# Patient Record
(demographics unavailable — no encounter records)

---

## 2024-10-18 NOTE — ASSESSMENT
[FreeTextEntry1] : PLAN: T9 thoracic kyphoplasty with radiofrequency ablation  No concerns identified today we thank the patient once again for allowing us to be a part of his care team.  Shyla Blancas MS, FNP-BC Calvin Elmore MD

## 2024-10-18 NOTE — HISTORY OF PRESENT ILLNESS
[FreeTextEntry1] : Mr. GUSTAFSON is a 64-year-old male presenting to the office today as a follow up, initially referred from Dr. Najera.   Patient was first seen on 10/1/2024 for mid-thoracic back pain that began four months prior while doing yard work. He consulted with Dr. Vallejo of pain management who performed a L4-5 NUBIA on 6/5/2024, minimal to no relief afterward. Dr. Gunter of hematology is facilitating a PET scan post biopsy. Patient underwent a T9 biopsy with Dr. Mays on 9/13/2024 appreciating a plasma cell neoplasm, + monoclonal IgG lambda lab work. He complains today of persistent midthoracic mechanical back pain. 5/6-10 pain with movement despite conservative measures and conservative medical management.  NEW IMAGING:  10/10/2024 MRI thoracic spine: Redemonstration of malignant compression fracture deformity of T9 with 50% height loss. No significant bony retropulsion. 10/10/2024 MRI lumbar spine: Mild multilevel disc bulging and degenerative changes without spinal canal or foraminal narrowing.  A surgical intervention in the form of a T9 thoracic kyphoplasty with radiofrequency ablation was discussed with the patient today, risks and benefits of such. He was made aware that this was the general consensus of the multidisciplinary tumor board that his case was presented at recently, to note. The patient wished to proceed and will be scheduled accordingly.  All questions addressed today.  PHYSICAL EXAM: Constitutional: Well appearing, no distress HEENT: Normocephalic Atraumatic Psychiatric: Alert and oriented to all spheres, normal mood Pulmonary: No respiratory distress  Neurologic: CN II-XII grossly intact Palpation: + mid spine tenderness, thoracic region upon palpation Strength: Full strength in all major muscle groups, no atrophy Sensation: Full sensation to light touch in all extremities Reflexes: 2+ patellar 2+ biceps  No Esquivel's No Clonus  ROM intact  SLR negative b/l Gait: Steady, walking w/o assistance.

## 2024-10-18 NOTE — END OF VISIT
[FreeTextEntry3] : I have independently evaluated and examined this patient, have supervised and agree with the Advanced Clinical Practice provider and their history and physical and assessment and plan above.  Risk benefits and alternatives to the T9 radiofrequency ablation of spine tumor, kyphoplasty were discussed with the patient risks include but not limited to infection, bleeding, cement migration, neurologic damage or deficit including but not limited to weakness, paralysis, coma, death.  Understanding these risks the patient is given informed consent to proceed. [Time Spent: ___ minutes] : I have spent [unfilled] minutes of time on the encounter which excludes teaching and separately reported services.

## 2024-10-25 NOTE — VITALS
[Date: ____________] : Patient's last distress assessment performed on [unfilled]. [0 - No Distress] : Distress Level: 0 [Patient given social work contact information and resource sheet] : Patient was given social work contact information and resource sheet [Maximal Pain Intensity: 6/10] : 6/10 [Pain Location: ___] : Pain Location: [unfilled] [NoTreatment Scheduled] : no treatment scheduled [90: Able to carry normal activity; minor signs or symptoms of disease.] : 90: Able to carry normal activity; minor signs or symptoms of disease.

## 2024-10-26 NOTE — ASSESSMENT
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 The PET scan showed only the T9 lesion "with associated compression". However, his serum immunofixation showed an IgG lambda spike and the patient still needs bone marrow evaluation. That is scheduled to be done in a few days. In addition, he will be seen by RT later today and proceed with radiation for at least comfort measure, especially if the bone marrow fails to reveal any evidence of marrow involvement. In the meantime, will repeat the blood work including CBC, CMP, LDH, SPEP with IFES, free light chains, quantitative IgG. Further recommendations after the above resulted.  The patient will be seen regularly. The schedule to be finalized after all the above completed.  All questions answered.

## 2024-10-26 NOTE — HISTORY OF PRESENT ILLNESS
[Disease:__________________________] : Disease: [unfilled] [Date: ____________] : Patient's last distress assessment performed on [unfilled]. [0 - No Distress] : Distress Level: 0 [ECOG Performance Status: 1 - Restricted in physically strenuous activity but ambulatory and able to carry out work of a light or sedentary nature] : Performance Status: 1 - Restricted in physically strenuous activity but ambulatory and able to carry out work of a light or sedentary nature, e.g., light house work, office work [de-identified] : The patient is coming for a follow up for his plasmacytoma at T9. He is scheduled for surgical intervention on T9 (kyphoplasty). The patient is giving the same back discomfort situation as before. No other new complaints. No fever or night sweats. The appetite is good.

## 2024-10-26 NOTE — HISTORY OF PRESENT ILLNESS
[Disease:__________________________] : Disease: [unfilled] [Date: ____________] : Patient's last distress assessment performed on [unfilled]. [0 - No Distress] : Distress Level: 0 [ECOG Performance Status: 1 - Restricted in physically strenuous activity but ambulatory and able to carry out work of a light or sedentary nature] : Performance Status: 1 - Restricted in physically strenuous activity but ambulatory and able to carry out work of a light or sedentary nature, e.g., light house work, office work [de-identified] : The patient is coming for a follow up for his plasmacytoma at T9. He is scheduled for surgical intervention on T9 (kyphoplasty). The patient is giving the same back discomfort situation as before. No other new complaints. No fever or night sweats. The appetite is good.

## 2024-10-27 NOTE — END OF VISIT
[Time Spent: ___ minutes] : I have spent [unfilled] minutes of time on the encounter which excludes teaching and separately reported services. [FreeTextEntry3] : MD Note: I personally performed the evaluation and management services for this patient. This includes conducting the examination, assessing all conditions, and establishing the plan of care. Today, my ACP, Vaishali Ruffin, was here to observe my evaluation and management services for this patient. I agree with the documentation above, which I have reviewed and edited where appropriate.

## 2024-10-27 NOTE — REVIEW OF SYSTEMS
[Joint Pain] : joint pain [Negative] : Allergic/Immunologic [de-identified] : diabetes [FreeTextEntry9] : Mid back pain/ right hip replacement

## 2024-10-27 NOTE — PHYSICAL EXAM
[General Appearance - Well Developed] : well developed [General Appearance - Alert] : alert [General Appearance - In No Acute Distress] : in no acute distress [] : no respiratory distress [Oriented To Time, Place, And Person] : oriented to person, place, and time [de-identified] : Steady Gait

## 2024-10-27 NOTE — HISTORY OF PRESENT ILLNESS
[FreeTextEntry1] : Mr.Joseph Guido is 64 yr old male here for initial consultation. Patient had c/o mid back pain a few months back after yard work. He had reached out to /pain management who performed Epidural steroid injection of L4-5 on 6/5/24. He got minimal relief. He was sent for imaging with  and after imaging had resulted, he was recommended to see Hemonc, .  He proceeded to have a T9 biopsy with  on 9/13/24 which showed plasma cell neoplasm (plasmacytoma). He also continued to have a petscan as well. Imaging showed compression fracture of T9 and so was sent to Neurosurgery. He went on to have consultation with  and plan is for T9 thoracic kyphoplasty with radiofrequency ablation on Thursday 10/31/2024.    Pain is stable. 6/10. He does not take any pain meds.   He saw Dr Gunter today and has scheduled a bone marrow biopsy for next Wednesday on Oct 30,2024.   Pathology: 9/13/24: Final Diagnosis BONE, T9 VERTEBRAL LESION; CT-GUIDED CORE NEEDLE BIOPSY: - Plasma cell neoplasm. - Fragments of trabecular bone with focal necrosis and fibrosis. - No marrow elements present. Addendum This addendum is to report the results of immunostains performed on block 1A at [facility]. Kappa/lambda IHC (block 1A): Highlights lambda light chain restricted plasma cells. Note: Above finding confirms the lambda light chain restriction of the plasma cells.   CT Thoracic 8/27/24: IMPRESSION: Redemonstrated moderate compression fracture of T9 with osseous retropulsion resulting in mild spinal stenosis and moderate bilateral neuroforaminal stenosis at this T9-10, likely pathologic in nature.   CT abdomen/chest:10/3/2024: IMPRESSION: Since May 13, 2024 No suspicious renal pelvic lesions.  Lucent lesion, T9 vertebral body with associated compression (602/86). Degree of compression has increased since 5/13/2024 (possibly due to underlying hemangioma).  MRI Thoracic/ lumbar spine:10/10/24 IMPRESSION: THORACIC SPINE: Redemonstration of malignant compression fracture deformity of T9 with 50% height loss. No significant bony retropulsion. LUMBAR SPINE: Mild multilevel disc bulging and degenerative changes without spinal canal or foraminal narrowing.   Petscan: 10/16/2024: IMPRESSION: Mild FDG uptake (SUV 5.5) co-registering with the T9 vertebral body with associated compression without significant anatomic change compared to recent CT abdomen 9/27/2024 No other definite sites of abnormal FDG uptake to suggest biologic tumor activity

## 2024-10-27 NOTE — PHYSICAL EXAM
[General Appearance - Well Developed] : well developed [General Appearance - Alert] : alert [General Appearance - In No Acute Distress] : in no acute distress [] : no respiratory distress [Oriented To Time, Place, And Person] : oriented to person, place, and time [de-identified] : Steady Gait

## 2024-10-27 NOTE — REVIEW OF SYSTEMS
[Joint Pain] : joint pain [Negative] : Allergic/Immunologic [FreeTextEntry9] : Mid back pain/ right hip replacement [de-identified] : diabetes

## 2024-10-30 NOTE — PROCEDURE
[Bone Marrow Biopsy] : bone marrow biopsy [Bone Marrow Aspiration] : bone marrow aspiration  [Patient] : the patient [Verbal Consent Obtained] : verbal consent was obtained prior to the procedure [Patient identification verified] : patient identification verified [Procedure verified and consent obtained] : procedure verified and consent obtained [Left lateral decibitus position] : left lateral decibitus position [Superior iliac spine was identified] : the superior iliac spine was identified. [The right posterior iliac crest was prepped with betadine and draped, using sterile technique.] : The right posterior iliac crest was prepped with betadine and draped, using sterile technique. [Lidocaine was injected and into the periosteum overlying the site.] : Lidocaine was injected and into the periosteum overlying the site. [Aspirate] : aspirate [Cytogenetics] : cytogenetics [FISH] : FISH [Biopsy] : biopsy [Flow Cytometry] : flow cytometry [] : The patient was instructed to remove the bandage the following AM. The patient may bathe. Acetaminophen may be taken for discomfort, as per package directions.If there are any other problems, the patient was instructed to call the office. The patient verbalized understanding, and is aware of the office contact numbers. [FreeTextEntry1] : T9 plasmacytoma

## 2024-11-04 NOTE — DISCUSSION/SUMMARY
[de-identified] : Chief complaint: Left hip/buttock pain  HPI: Patient is a 64-year-old male who presents the office today for the evaluation of pain to the left hip/buttock which manifested 7 days ago.  He reports that he was walking when he tripped and fell onto his left side.  He immediately experienced pain to the left lateral hip/left lateral buttock.  Patient was able to walk after the injury.  He has been able to ambulate with a limp since the fall.  He localizes the pain to the lateral aspect of the hip/to the lateral aspect of his left buttock by pointing.  He reports taking ibuprofen with some relief of his discomfort.  He reports that he has pain with ambulation, palpation over the area, and with laying on his left side.  He does report that there has been some improvement in his pain since onset.  No reported previous surgical intervention to the left hip.    ROS: Positive for left hip pain  Physical examination of the left hip:  Ecchymosis noted posterior to the greater trochanter over the lateral aspect of the left buttock  no appreciable erythema Skin is intact Good range of motion of the left hip in flexion, extension, abduction, adduction, internal rotation, external rotation Discomfort with resisted hip flexion Minimal tenderness over the left greater trochanter Tenderness to palpation over the left lateral buttock posterior to the left greater trochanter over the area of ecchymosis No appreciable tenderness over the posterior aspect of the left buttock, left groin, left anterior/medial/lateral/posterior thigh  5 out of 5 strength in left hip flexion, knee extension, dorsiflexion, plantarflexion  2 view x-rays of the left hip and pelvis performed in the office today show no obvious acute displaced fracture, subluxation, or dislocation  Assessment/plan: Left hip contusion, discussed with the patient that contusions typically take 4 to 6 weeks to heal/resolve, discussed treatment options with the patient, A prescription for meloxicam 15 mg once daily to be taken as needed with food was sent to the patient's pharmacy, confirmed no contraindications to NSAIDS. Patient denies being on a blood thinner. Denies history of GIB or GI ulcer.  Discussed in detail with the patient that they cannot take over-the-counter NSAIDs including but not limited to ibuprofen, Advil, Aleve, or Motrin while taking this medication.  They can continue to take over-the-counter Tylenol.  Patient was provided with a prescription for formal physical therapy for the left hip so that he can get started on that, he can apply ice or heat to the left hip on an as-needed basis with sensory precautions, patient will be provided with a 3-week follow-up with me for repeat evaluation, he verbalized understanding of all findings in the office today and agrees to follow-up as directed

## 2024-11-04 NOTE — ASSESSMENT
[FreeTextEntry1] : LORIE GUSTAFSON is a 64-year-old male who presents with a h/o acute onset of worsening LUTS found to have UTI, likely prostatitis in 2019. Patient had UTI in 2021 Proteus which was alleviated with antibiotics. Also, hx gross hematuria status post negative work-up 2022.  CT Urogram confirms no suspicious renal lesion.   Patient doing well.  Urinary symptoms improved with diabetic control.  Now on metformin.   RTO PRN.  Continue PSA with medical doctor.

## 2024-11-04 NOTE — HISTORY OF PRESENT ILLNESS
[FreeTextEntry1] : LORIE GUSTAFSON is a 64-year-old male who presents with a h/o acute onset of worsening LUTS found to have UTI, likely prostatitis in 2019. Patient had UTI in 2021 Proteus which was alleviated with antibiotics. Also, hx gross hematuria status post negative work-up 2022.  Since last visit, Urinary symptoms improved with diabetic control.  Denies dysuria and gross hematuria.   Abdomen US 08/10/2024 from OSH IMPRESSION: Enlarged fatty liver. Enlarged bilateral kidneys. Left renal sinus focal heterogeneity as described above. While pt has always had prominent extrarenal pelvis, this now appears complex, possibly representing artifact, hemorrhagic and or proteinaceous material and/or urothelial neoplasm. Recommend CTU.  Follow up CT URO 09/2024- No suspicious renal pelvic lesions. Lucent lesion, T9 vertebral body with associated compression (602/86). Degree of compression has increased since 5/13/2024 (possibly due to underlying hemangioma). Patient is aware of this and had procedure last week for this fracture.  Images visualized.  And agree with the findings no upper tract filling defects noted no stones no hydronephrosis  Prior: Labs 07//03/2024 Cr 0.89 GFR 96 A1c 7.6 PSA 0.70  Presents to office for annual follow-up. Reports doing well. No hematuria over the last year. Blood work from August 2023 revealed no RBCs on urine analysis and PSA of 1.94 ng/mL. Patient states 2 months ago he did have dysuria after drinking a beverage high in sugar. He states that he was sent antibiotics by his physician and dysuria improved.  Creatinine 09/01/22 - 0.9 GFR- 97  CT abdomen pelvis 08/30/22 images : Unremarkable CT urogram. Hepatic steatosis. Agree with findings no stones no hydronephrosis and no upper tract filling defects.  U/A from 07/28/22 is unremarkable , as well as UCx . Urine cytology showed atypical findings : Rare atypical urothelial cells seen.  - Other urothelial cells with reactive and degenerative changes, red blood cells, acute inflammatory cells, squamous cells, and bacteria.  previously PSA in 2022- 1.30 ng/mL Patient denies history of smoking. Patient denies 9/11 exposure.  Denies  PMH including kidney stones, recurrent UTIs. Family History: uncle w PCa Soc hx asencio, monogamous relationship  Old records reviewed  Cr 1.0 eGFR >60 10/2019  UA- neg 11/2019  Ucx- neisseria sicca 1,000cfu/ml- pt was asymptomatic at that time and not treated  did not have US that was ordered after last visit  Udip- neg  bladder scan -0ml

## 2024-11-04 NOTE — ADDENDUM
[FreeTextEntry1] : Patient's note was transcribed by physician assistant Nasim Gill, under the supervision of Dr. Martinez. I, Dr. Martinez, have reviewed the patient's chart and agree that it aligns with my medical decisions.  The submitted E/M billing level for this visit reflects the total time spent on the day of the visit including face-to-face time spent with the patient, non-face-to-face review of medical records and relevant information, documentation, and asynchronous communication with the patient after a visit via phone, email, or patients EHR portal after the visit.  The medical records reviewed are either scanned into the chart or reviewed with the patient using a patients electronic medical records portal for patients with records not available to [facility] via electronic transmission platforms from other institutions and labs.  Time spend counseling and performing coordination of care was also included in determining the appropriate EM billing level.

## 2024-11-19 NOTE — END OF VISIT
[FreeTextEntry3] : I have independently evaluated and examined this patient, have supervised and agree with the Advanced Clinical Practice provider and their history and physical and assessment and plan above. [Time Spent: ___ minutes] : I have spent [unfilled] minutes of time on the encounter which excludes teaching and separately reported services.

## 2024-11-19 NOTE — ASSESSMENT
[FreeTextEntry1] : Mr. GUSTAFSON is a pleasant 64-year-old male presenting to the office as a follow up status post T9 percutaneous biopsy, radiofrequency ablation, kyphoplasty performed on 10/31/2024. Doing well.   PLAN: Proceed with radiation plan established with Dr. aMnn  Return to our office in three months, sooner if needed  Shyla Blancas MS, FNP-BC Calvin Elmore MD

## 2024-12-12 NOTE — DISCUSSION/SUMMARY
[de-identified] : Chief complaint: Follow-up on left hip pain  HPI: Patient is a 64-year-old male who presents the office today for follow-up on left hip pain which manifested following an injury sustained approximately 6 weeks ago.  Patient was seen by me approximately 1 week following his injury.  At that time x-rays were performed which did not show any obvious acute fracture or dislocation.  Patient was prescribed meloxicam and was told to start physical therapy.  Patient starts that he did not start the meloxicam and did not start physical therapy.  He has seen approximately 25% improvement in his pain since onset.  States that he has pain localized over the greater trochanter of the left hip.  Has pain with ambulation as well as with laying on the left side.  ROS: Positive for left hip pain  Physical examination of the left hip:  There is no appreciable edema No erythema No ecchymosis Skin is intact Patient is able to actively flex, extend, abduct, adduct Good range of motion with internal and external rotation There is no significant tenderness to palpation over the left gluteal musculature, left groin, left thigh There is tenderness to palpation over the left greater trochanter  5 out of 5 strength in resisted hip flexion on the left, knee extension, dorsiflexion  Assessment/plan: Injury of the left hip, high degree suspicion for left greater trochanteric bursitis, discussed ongoing treatment options with the patient, at this time the patient has not tried anti-inflammatory medications or therapy, I discussed starting the anti-inflammatory medications and starting physical therapy, discussed activity modifications with the patient, patient can apply ice or heat to the left greater trochanter/hip on an as-needed basis with sensory precautions  Discussed the possibility of a corticosteroid injection to the greater trochanteric bursa however the patient is diabetic and does not take his blood glucose, discussed with the patient that at his next follow-up appointment we can consider corticosteroid injection should he take his blood glucose regularly and to be under control  Patient will be provided with a 2-month follow-up with me for repeat evaluation, he verbalized understanding of all findings in the office today, agrees to follow-up as directed

## 2024-12-17 NOTE — DISEASE MANAGEMENT
[Clinical] : TNM Stage: c [N/A] : Currently not applicable [TTNM] : x [NTNM] : x [MTNM] : x [de-identified] : 400cGy [de-identified] : 7350cQl [de-identified] : Thoracic spinal cord

## 2024-12-17 NOTE — HISTORY OF PRESENT ILLNESS
[FreeTextEntry1] : 12/17/2024 OTV: 2 of 23 treatments to the Thoracic spine: Patient reports feeling well. He has aching in his lower back, 6-7/10. Skin care instructions reviewed. No new concerns at this time.

## 2024-12-23 NOTE — HISTORY OF PRESENT ILLNESS
[FreeTextEntry1] : 12/23/2024 OTV 6/23 Treatments to the Thoracic Spine. Patient feels well. denies any fatigue. Mild discomfort in back when he lies on table but otherwise denies any back pain.   12/17/2024 OTV: 2 of 23 treatments to the Thoracic spine: Patient reports feeling well. He has aching in his lower back, 6-7/10. Skin care instructions reviewed. No new concerns at this time.

## 2024-12-23 NOTE — DISEASE MANAGEMENT
[Clinical] : TNM Stage: c [N/A] : Currently not applicable [TTNM] : x [NTNM] : x [MTNM] : x [de-identified] : 1200cGy [de-identified] : 6550cXa [de-identified] : Thoracic spinal cord

## 2024-12-23 NOTE — DISEASE MANAGEMENT
[Clinical] : TNM Stage: c [N/A] : Currently not applicable [TTNM] : x [NTNM] : x [MTNM] : x [de-identified] : 1200cGy [de-identified] : 3000cMa [de-identified] : Thoracic spinal cord

## 2025-01-03 NOTE — DISEASE MANAGEMENT
[Clinical] : TNM Stage: c [N/A] : Currently not applicable [TTNM] : x [NTNM] : x [MTNM] : x [de-identified] : 2200cGy [de-identified] : 7630cDy [de-identified] : Thoracic spinal cord

## 2025-01-03 NOTE — DISEASE MANAGEMENT
[Clinical] : TNM Stage: c [N/A] : Currently not applicable [TTNM] : x [NTNM] : x [MTNM] : x [de-identified] : 2200cGy [de-identified] : 5330cOm [de-identified] : Thoracic spinal cord

## 2025-01-03 NOTE — HISTORY OF PRESENT ILLNESS
[FreeTextEntry1] : 12/31/2024 OTV 11/23 Treatments to the Thoracic Spine. Mr. Guido is doing good, his only concern is mild discomfort #5 on pain scale, he does not want to take any pain medication. Otherwise, no new concerns.   12/23/2024 OTV 6/23 Treatments to the Thoracic Spine. Patient feels well. denies any fatigue. Mild discomfort in back when he lies on table but otherwise denies any back pain.   12/17/2024 OTV: 2 of 23 treatments to the Thoracic spine: Patient reports feeling well. He has aching in his lower back, 6-7/10. Skin care instructions reviewed. No new concerns at this time.

## 2025-01-03 NOTE — DISEASE MANAGEMENT
[Clinical] : TNM Stage: c [N/A] : Currently not applicable [TTNM] : x [NTNM] : x [MTNM] : x [de-identified] : 2200cGy [de-identified] : 8770cTh [de-identified] : Thoracic spinal cord

## 2025-01-07 NOTE — HISTORY OF PRESENT ILLNESS
[FreeTextEntry1] : 1/7/2025 OTV: 15 of 23 treatments to the thoracic spine: Patient feeling well. He has some mild fatigue. He gets some mild pain/stiffness to lower back at times. Especially after RT because he has to lay on the treatment table. He takes Tylenol/Advil once in a while for pain.   12/31/2024 OTV 11/23 Treatments to the Thoracic Spine. Mr. Guido is doing good, his only concern is mild discomfort #5 on pain scale, he does not want to take any pain medication. Otherwise, no new concerns.   12/23/2024 OTV 6/23 Treatments to the Thoracic Spine. Patient feels well. denies any fatigue. Mild discomfort in back when he lies on table but otherwise denies any back pain.   12/17/2024 OTV: 2 of 23 treatments to the Thoracic spine: Patient reports feeling well. He has aching in his lower back, 6-7/10. Skin care instructions reviewed. No new concerns at this time.

## 2025-01-07 NOTE — DISEASE MANAGEMENT
[Clinical] : TNM Stage: c [N/A] : Currently not applicable [TTNM] : x [NTNM] : x [de-identified] : 3000cGy [MTNM] : x [de-identified] : 6040cZw [de-identified] : Thoracic spinal cord

## 2025-01-15 NOTE — DISEASE MANAGEMENT
[Clinical] : TNM Stage: c [N/A] : Currently not applicable [TTNM] : x [NTNM] : x [MTNM] : x [de-identified] : 4000cGy [de-identified] : 9000cQt [de-identified] : Thoracic spinal cord

## 2025-01-15 NOTE — DISEASE MANAGEMENT
[Clinical] : TNM Stage: c [N/A] : Currently not applicable [TTNM] : x [NTNM] : x [MTNM] : x [de-identified] : 4000cGy [de-identified] : 0310cMy [de-identified] : Thoracic spinal cord

## 2025-01-15 NOTE — HISTORY OF PRESENT ILLNESS
[FreeTextEntry1] : 1/14/2025 OTV 20/23 treatments to the T-spine: Patient reports feeling well. Denies fatigue. He has intermittent lower back pain/aching 5-6/10 especially after laying flat for RT. He occasionally takes Advil for relief. He denies change in sensation, numbness or tingling. He has no new concerns.  1/7/2025 OTV: 15 of 23 treatments to the thoracic spine: Patient feeling well. He has some mild fatigue. He gets some mild pain/stiffness to lower back at times. Especially after RT because he has to lay on the treatment table. He takes Tylenol/Advil once in a while for pain.   12/31/2024 OTV 11/23 Treatments to the Thoracic Spine. Mr. Guido is doing good, his only concern is mild discomfort #5 on pain scale, he does not want to take any pain medication. Otherwise, no new concerns.   12/23/2024 OTV 6/23 Treatments to the Thoracic Spine. Patient feels well. denies any fatigue. Mild discomfort in back when he lies on table but otherwise denies any back pain.   12/17/2024 OTV: 2 of 23 treatments to the Thoracic spine: Patient reports feeling well. He has aching in his lower back, 6-7/10. Skin care instructions reviewed. No new concerns at this time.

## 2025-01-15 NOTE — VITALS
Price (Use Numbers Only, No Special Characters Or $): 312 [Maximal Pain Intensity: 6/10] : 6/10 [Least Pain Intensity: 0/10] : 0/10 [90: Able to carry normal activity; minor signs or symptoms of disease.] : 90: Able to carry normal activity; minor signs or symptoms of disease.

## 2025-01-15 NOTE — DISEASE MANAGEMENT
[Clinical] : TNM Stage: c [N/A] : Currently not applicable [TTNM] : x [MTNM] : x [NTNM] : x [de-identified] : 4000cGy [de-identified] : 7160cTj [de-identified] : Thoracic spinal cord

## 2025-02-18 NOTE — ASSESSMENT
[FreeTextEntry1] : 66yo M status post T9 bx, RFA, kyphoplasty 10/31/2024, also status post XRT with Dr. Mann. He is complaining of persistent midthoracic back pain radiating to his lower back with minimal movements including standing for a few minutes or bending slightly too far forward while working at this desk or when attempting to lay flat. New imaging warranted.   PLAN: XRAYS scoliosis series CT T + L  MRI T + L w/wo IVC  Pain management for medical management  RTC after imaging, sooner if needed  Shyla Blancas MS, FNP-BC Calvin Elmore MD

## 2025-02-18 NOTE — HISTORY OF PRESENT ILLNESS
[FreeTextEntry1] : Mr. GUSTAFSON  is a 65-year-old male presenting to the office as a follow up status post T9 radiofrequency ablation of a T9 tumor percutaneous biopsy with a T9 kyphoplasty performed on 10/31/2024.  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 Patient failed conservative management and continues to complain of persistent thoracic pain. He is a patient of Dr. Nikolas Vallejo. He failed to gain any relief with conservative management including NUBIA's and so he underwent a T9 kyphoplasty with RFA on 10/31/2024. He is also under the care of Dr. Mann, underwent XRT. He is pending follow-up on 2/20/2025.  Today the patient complains of persistent midthoracic discomfort that is present on a daily basis. He states that prior to surgery it was more of a spasmodic sensation. He attends physical therapy once weekly but does not note significant relief thereafter. He has pain upon ambulation and with standing. He stands for long period of time at work despite being encouraged not to do so. He denies any radicular features or electric sensations. Patient has persistent achiness. 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 It was discussed with the patient several times that he may need to alter some of his activities postoperatively and it is not certain whether he has done so. Patient reports that he continues to lie completely flat in bed and this results in pain. He was reminded that postoperatively will be difficult to lie flat and so this may be partially why he is feeling the discomfort.  Preoperative pain level was 10/10, today 6/10. He is ambulating without any assistive devices. Denies incontinence of bowel or bladder. Plan is to obtain new an updated imaging to assure that there are no postsurgical concerns. Patient will complete full body x-rays in the form of x-ray scoliosis series. CT thoracic and lumbar spine. MRI thoracic lumbar spine with and without IV contrast. He will also be referred back to pain management to ensure that he is prescribed appropriate medical management for at this time he is only taking OTC medications PRN. Mr. GUSTAFSON understands that once all imaging has been completed, he will return back to the office to review the results so that a further neurosurgical plan can be devised, aware that he can contact us sooner if needed.  PHYSICAL EXAM: Constitutional: Well appearing, no distress HEENT: Normocephalic Atraumatic Psychiatric: Alert and oriented to all spheres, normal mood Pulmonary: No respiratory distress  Neurologic: CN II-XII intact Palpation: midthoracic discomfort upon palpation Strength: Full strength in all major muscle groups, no atrophy Sensation: Full sensation to light touch in all extremities Reflexes: 2+ patellar 2+ biceps  No Esquivel's No Clonus  ROM intact  SLR negative b/l Gait: Steady, walking w/o assistance.

## 2025-02-20 NOTE — VITALS
[Maximal Pain Intensity: 7/10] : 7/10 [Pain Location: ___] : Pain Location: [unfilled] [OTC] : OTC [NSAID/Non-Opioid] : NSAID/Non-Opioid [80: Normal activity with effort; some signs or symptoms of disease.] : 80: Normal activity with effort; some signs or symptoms of disease.

## 2025-02-20 NOTE — PHYSICAL EXAM
[General Appearance - Well Developed] : well developed [General Appearance - Alert] : alert [General Appearance - In No Acute Distress] : in no acute distress [] : no respiratory distress [Normal] : no focal deficits [Oriented To Time, Place, And Person] : oriented to person, place, and time

## 2025-02-25 NOTE — HISTORY OF PRESENT ILLNESS
[Date: ____________] : Patient's last distress assessment performed on [unfilled]. [0 - No Distress] : Distress Level: 0 [ECOG Performance Status: 1 - Restricted in physically strenuous activity but ambulatory and able to carry out work of a light or sedentary nature] : Performance Status: 1 - Restricted in physically strenuous activity but ambulatory and able to carry out work of a light or sedentary nature, e.g., light house work, office work [Disease:__________________________] : Disease: [unfilled] [de-identified] : The patient is coming for a follow up for his T9 isolated plasmacytoma. He finished RT a few weeks ago. The pain in the back has gotten better but not entirely gone especially when lying flat in bed or when bending forward at the desk. Otherwise, he has no other complaints. Last colonoscopy "a couple of years ago" with negative results.

## 2025-02-25 NOTE — HISTORY OF PRESENT ILLNESS
[Date: ____________] : Patient's last distress assessment performed on [unfilled]. [0 - No Distress] : Distress Level: 0 [ECOG Performance Status: 1 - Restricted in physically strenuous activity but ambulatory and able to carry out work of a light or sedentary nature] : Performance Status: 1 - Restricted in physically strenuous activity but ambulatory and able to carry out work of a light or sedentary nature, e.g., light house work, office work [Disease:__________________________] : Disease: [unfilled] [de-identified] : The patient is coming for a follow up for his T9 isolated plasmacytoma. He finished RT a few weeks ago. The pain in the back has gotten better but not entirely gone especially when lying flat in bed or when bending forward at the desk. Otherwise, he has no other complaints. Last colonoscopy "a couple of years ago" with negative results.

## 2025-02-25 NOTE — PHYSICAL EXAM
[Restricted in physically strenuous activity but ambulatory and able to carry out work of a light or sedentary nature] : Status 1- Restricted in physically strenuous activity but ambulatory and able to carry out work of a light or sedentary nature, e.g., light house work, office work [Normal] : affect appropriate [de-identified] : But slightly overweight

## 2025-02-25 NOTE — REVIEW OF SYSTEMS
[Joint Pain] : joint pain [Negative] : Heme/Lymph [FreeTextEntry9] : Especially the back, although much better. [de-identified] : Sleeps well with the "sleep apnea machine".

## 2025-02-25 NOTE — ASSESSMENT
[FreeTextEntry1] : T9 isolated plasmacytoma, S/P RT, clinically CEE. The situation was discussed with the patient. Will obtain CBC, CMP, LDH, SPEP with IFES, free light chains.  Further recommendations after the above completed.  If all within acceptable limits, will follow up in 3-4 months.  All questions answered.

## 2025-02-25 NOTE — PHYSICAL EXAM
[Restricted in physically strenuous activity but ambulatory and able to carry out work of a light or sedentary nature] : Status 1- Restricted in physically strenuous activity but ambulatory and able to carry out work of a light or sedentary nature, e.g., light house work, office work [Normal] : affect appropriate [de-identified] : But slightly overweight

## 2025-02-25 NOTE — REVIEW OF SYSTEMS
[Joint Pain] : joint pain [Negative] : Heme/Lymph [FreeTextEntry9] : Especially the back, although much better. [de-identified] : Sleeps well with the "sleep apnea machine".

## 2025-04-15 NOTE — ASSESSMENT
[FreeTextEntry1] : 66yo M status post T9 bx, RFA, kyphoplasty 10/31/2024, also status post XRT with Dr. Mann. He presents today for follow-up. Out of an abundance of caution, new imaging was obtained to assure that there were no postsurgical concerns given his persistent midthoracic discomfort. We reviewed MRI L-spine, CT Thoracic, CT Lumbar, Scoliosis x-rays all which do not show any acute concerns.  No need for neurosurgical intervention or deformity surgery at this time.  Recommend aggressive conservative measures with physical therapy and pain management.  I am advising he proceed conservatively in the form of PT and pain management. I am also recommending consultation with Dr. Phelps for possible Reactiv8 for his right-sided thoracolumbar myofascial pain if above measures do not alleviate his symptoms.    Lashell Tang MS FNP-BC Nurse Practitioner [facility]  Calvin Elmore MD  Department of Neurosurgery [facility] [employer] [facility] / [facility]

## 2025-04-15 NOTE — HISTORY OF PRESENT ILLNESS
[FreeTextEntry1] : Mr. GUSTAFSON is a 64yo M with a history of plasmacytoma of T9 biopsied in September showing plasma cell neoplasm, plasmacytoma. He underwent a T9 kyphoplasty with RFA on 10/31/2024. He is also under the care of Dr. Mann, underwent XRT. Medical oncologist, Dr. Gunter.  After surgery, patient complained of improved mid thoracic axial back pain however developed new right-sided myofascial lumbar back pain that is present on a daily basis. He stated that prior to surgery it was more of a electric structure sensation in the middle of his back. He attended physical therapy once weekly but did not note significant relief thereafter. He had pain upon ambulation and with standing for prolonged periods of time. Out of an abundance of caution, new imaging was obtained to assure that there were no postsurgical concerns. We reviewed MRI L-spine, CT Thoracic, CT Lumbar, Scoliosis x-rays all which do not show any acute concerns or need for neurosurgical intervention at this time.  Images reviewed in a multidisciplinary fashion with our deformity expert Dr. Redmond as well.  TODAY, he is doing well. He presents today with right sided thoracolumbar myofascial pain.   No loss of bowel/bladder or saddle anesthesia. He is ambulating independently.   PHYSICAL EXAM: Constitutional: Well appearing, no distress HEENT: Normocephalic Atraumatic Psychiatric: Alert and oriented to all spheres, normal mood Pulmonary: No respiratory distress  Neurologic: CN II-XII intact Palpation: (+) right-sided lower thoracolumbar myofascial pain Strength: Full strength in all major muscle groups, no atrophy Sensation: Full sensation to light touch in all extremities Reflexes: 2+ patellar 2+ biceps No Esquivel's No Clonus ROM intact SLR negative b/l Gait: Steady, walking w/o assistance.

## 2025-06-24 NOTE — HISTORY OF PRESENT ILLNESS
[Disease:__________________________] : Disease: [unfilled] [Date: ____________] : Patient's last distress assessment performed on [unfilled]. [de-identified] : The patient is coming for a follow up visit for his T9 plasmacytoma treated with kyphoplasty and RT. At present, new lower back hurts him on and off, otherwise the previous pain has resolved (he could not even sleep comfortably before). The other situations when he may feel significant discomfort/pain is when he lifts heavy objects and when he stands up for long periods of time. Otherwise, no fever or night sweats. The appetite is good.  No headache or dizziness, but occasional lightheadedness for a few seconds. He doesn't remember the circumstances. He has not felt any new lumps or bumps. No new medications. He is continuing with metformin for his elevated blood sugar. Last colonoscopy was, may be ,2-3 years ago. He is continuing to work but cannot carry heavy objects.  [ECOG Performance Status: 1 - Restricted in physically strenuous activity but ambulatory and able to carry out work of a light or sedentary nature] : Performance Status: 1 - Restricted in physically strenuous activity but ambulatory and able to carry out work of a light or sedentary nature, e.g., light house work, office work

## 2025-06-24 NOTE — PHYSICAL EXAM
[Restricted in physically strenuous activity but ambulatory and able to carry out work of a light or sedentary nature] : Status 1- Restricted in physically strenuous activity but ambulatory and able to carry out work of a light or sedentary nature, e.g., light house work, office work [Normal] : affect appropriate [de-identified] : Somewhat overweight [de-identified] : Soft, no palpable masses or organs, mild midline hernia

## 2025-06-24 NOTE — HISTORY OF PRESENT ILLNESS
[Disease:__________________________] : Disease: [unfilled] [Date: ____________] : Patient's last distress assessment performed on [unfilled]. [de-identified] : The patient is coming for a follow up visit for his T9 plasmacytoma treated with kyphoplasty and RT. At present, new lower back hurts him on and off, otherwise the previous pain has resolved (he could not even sleep comfortably before). The other situations when he may feel significant discomfort/pain is when he lifts heavy objects and when he stands up for long periods of time. Otherwise, no fever or night sweats. The appetite is good.  No headache or dizziness, but occasional lightheadedness for a few seconds. He doesn't remember the circumstances. He has not felt any new lumps or bumps. No new medications. He is continuing with metformin for his elevated blood sugar. Last colonoscopy was, may be ,2-3 years ago. He is continuing to work but cannot carry heavy objects.  [ECOG Performance Status: 1 - Restricted in physically strenuous activity but ambulatory and able to carry out work of a light or sedentary nature] : Performance Status: 1 - Restricted in physically strenuous activity but ambulatory and able to carry out work of a light or sedentary nature, e.g., light house work, office work

## 2025-06-24 NOTE — REVIEW OF SYSTEMS
[Fatigue] : fatigue [Loss of Hearing] : loss of hearing [Joint Pain] : joint pain [Joint Stiffness] : joint stiffness [Negative] : Heme/Lymph [FreeTextEntry2] : Mild, "the usual". [FreeTextEntry3] : Upper Lt eyelid feeling like closing [FreeTextEntry4] : Just mild loss [FreeTextEntry9] : "All joints, especially hands and shoulders, "it's been a few years like that".

## 2025-06-24 NOTE — ASSESSMENT
[FreeTextEntry1] : T9 plasmacytoma, IgG lambda, S/P kyphoplasty and RT, clinically stable. The situation was discussed with the patient. Will proceed with the reevaluation of the disease. Will obtain CBC, CMP, LDH, SPEP with IFES, free light chains. Further recommendations after the above completed.  All questions answered.  If all stable, F/U in 3-4 months and as needed.

## 2025-06-24 NOTE — PHYSICAL EXAM
[Restricted in physically strenuous activity but ambulatory and able to carry out work of a light or sedentary nature] : Status 1- Restricted in physically strenuous activity but ambulatory and able to carry out work of a light or sedentary nature, e.g., light house work, office work [Normal] : affect appropriate [de-identified] : Somewhat overweight [de-identified] : Soft, no palpable masses or organs, mild midline hernia

## 2025-07-30 NOTE — DISCUSSION/SUMMARY
[de-identified] : Left knee pain  HPI Patient is a 65-year-old male reports to the office for evaluation of his left knee pain that has been aggravating him on and off for the past several months.  He states that he fell onto his left hip a few months back.  He believes that this has been throwing off the way he has been walking.  He has tried OTC medication which has given him no relief.  Admits that the knee has been clicking/giving out on him.  Walking, range of motion, and palpating certain areas of the knee aggravate the patient's pain.  Denies any numbness or tingling.  Left knee x-ray taken in office today revealed no obvious fractures, subluxations, or dislocations.  Mild medial joint space narrowing noted.  Likely enchondroma/bony cyst noted at distal femur region.  Otherwise, no other significant abnormalities were seen.  Left knee exam is as follows: Mild effusion noted.  No erythema or ecchymosis.  Able to perform active straight leg raise.  Knee flexion from 0 to 120 degrees with stiffness and pain.  Patellofemoral, medial/lateral joint line tenderness palpation.  Calf soft nontender.  Positive Lidia's.  Light touch intact throughout.  Mild antalgic gait.  Assessment/plan Explained x-ray findings with mild OA and likely enchondroma.  He clinically may also have a meniscal tear.  Left knee MRI ordered for further evaluation.  Patient was advised to call the office a few days after getting the MRI done to discuss results over the phone.  He may continue OTC NSAIDs as needed for pain.  Continue knee brace for support/stability.  The patient was advised to rest/ice the area and may alternate with warm compresses as needed.  The knee conditioning program from the [employer] was given to the patient so they may try that at home.  Follow-up in 6 weeks for further evaluation.  May consider injections in future if still symptomatic.  All question/concerns were answered in detail.